# Patient Record
Sex: FEMALE | URBAN - METROPOLITAN AREA
[De-identification: names, ages, dates, MRNs, and addresses within clinical notes are randomized per-mention and may not be internally consistent; named-entity substitution may affect disease eponyms.]

---

## 2023-07-28 ENCOUNTER — PREPPED CHART (OUTPATIENT)
Dept: URBAN - METROPOLITAN AREA CLINIC 50 | Facility: CLINIC | Age: 78
End: 2023-07-28

## 2023-08-23 ENCOUNTER — NEW PATIENT (OUTPATIENT)
Dept: URBAN - METROPOLITAN AREA CLINIC 50 | Facility: LOCATION | Age: 78
End: 2023-08-23

## 2023-08-23 DIAGNOSIS — H02.831: ICD-10-CM

## 2023-08-23 DIAGNOSIS — H02.403: ICD-10-CM

## 2023-08-23 DIAGNOSIS — H02.834: ICD-10-CM

## 2023-08-23 DIAGNOSIS — H25.813: ICD-10-CM

## 2023-08-23 PROCEDURE — 92015 DETERMINE REFRACTIVE STATE: CPT

## 2023-08-23 PROCEDURE — 99204 OFFICE O/P NEW MOD 45 MIN: CPT

## 2023-08-23 ASSESSMENT — KERATOMETRY
OS_AXISANGLE_DEGREES: 91
OD_K2POWER_DIOPTERS: 44.75
OD_K1POWER_DIOPTERS: 44.25
OS_AXISANGLE2_DEGREES: 1
OD_AXISANGLE_DEGREES: 131
OD_AXISANGLE2_DEGREES: 41
OS_K1POWER_DIOPTERS: 44.25
OS_K2POWER_DIOPTERS: 45.00

## 2023-08-23 ASSESSMENT — VISUAL ACUITY
OD_SC: 20/70+2
OS_GLARE: 20/70
OD_GLARE: 20/80
OS_CC: 20/50 BLURRY
OS_SC: 20/60-2
OU_CC: J1+ @ 16"
OS_GLARE: 20/200
OD_GLARE: 20/50
OD_CC: 20/30

## 2023-08-23 ASSESSMENT — TONOMETRY
OD_IOP_MMHG: 15
OS_IOP_MMHG: 13

## 2023-09-20 ENCOUNTER — PRE-OP/H&P (OUTPATIENT)
Dept: URBAN - METROPOLITAN AREA CLINIC 50 | Facility: LOCATION | Age: 78
End: 2023-09-20

## 2023-09-20 DIAGNOSIS — H25.813: ICD-10-CM

## 2023-09-20 PROCEDURE — PREOP PRE OP VISIT

## 2023-09-20 PROCEDURE — 92025-3 CORNEAL TOPO, REFUSED

## 2023-09-20 PROCEDURE — 92136 OPHTHALMIC BIOMETRY: CPT

## 2023-09-20 ASSESSMENT — KERATOMETRY
OD_K2POWER_DIOPTERS: 44.75
OD_AXISANGLE2_DEGREES: 110
OS_AXISANGLE2_DEGREES: 1
OD_AXISANGLE2_DEGREES: 41
OS_K2POWER_DIOPTERS: 44.12
OD_K1POWER_DIOPTERS: 44.50
OD_AXISANGLE_DEGREES: 131
OS_K2POWER_DIOPTERS: 45.00
OD_K2POWER_DIOPTERS: 43.87
OS_K1POWER_DIOPTERS: 44.62
OD_K1POWER_DIOPTERS: 44.25
OS_K1POWER_DIOPTERS: 44.25
OS_AXISANGLE_DEGREES: 174
OD_AXISANGLE_DEGREES: 20
OS_AXISANGLE_DEGREES: 91
OS_AXISANGLE2_DEGREES: 84

## 2023-09-20 ASSESSMENT — VISUAL ACUITY
OD_CC: 20/30-1
OS_PH: 20/40
OU_CC: 20/25
OS_CC: 20/50-1

## 2023-09-20 ASSESSMENT — TONOMETRY
OD_IOP_MMHG: 15
OS_IOP_MMHG: 14

## 2024-04-17 ENCOUNTER — PRE-OP/H&P (OUTPATIENT)
Dept: URBAN - METROPOLITAN AREA CLINIC 50 | Facility: LOCATION | Age: 79
End: 2024-04-17

## 2024-04-17 DIAGNOSIS — H25.813: ICD-10-CM

## 2024-04-17 PROCEDURE — 92136 OPHTHALMIC BIOMETRY: CPT

## 2024-04-17 PROCEDURE — PREOP PRE OP VISIT

## 2024-04-17 ASSESSMENT — VISUAL ACUITY
OD_GLARE: 20/50
OS_CC: 20/40-1
OD_CC: 20/25
OU_CC: J1+@16"
OD_GLARE: 20/70
OS_GLARE: 20/70
OU_CC: 20/25
OS_GLARE: 20/200

## 2024-04-17 ASSESSMENT — KERATOMETRY
OS_AXISANGLE_DEGREES: 91
OD_K2POWER_DIOPTERS: 44.75
OS_K1POWER_DIOPTERS: 44.25
OD_AXISANGLE_DEGREES: 25
OS_AXISANGLE2_DEGREES: 85
OS_K2POWER_DIOPTERS: 44.12
OS_K1POWER_DIOPTERS: 44.87
OS_AXISANGLE_DEGREES: 175
OD_AXISANGLE_DEGREES: 131
OS_AXISANGLE2_DEGREES: 1
OD_K2POWER_DIOPTERS: 43.87
OD_K1POWER_DIOPTERS: 44.25
OS_K2POWER_DIOPTERS: 45.00
OD_AXISANGLE2_DEGREES: 115
OD_AXISANGLE2_DEGREES: 41
OD_K1POWER_DIOPTERS: 44.50

## 2024-04-17 ASSESSMENT — TONOMETRY
OS_IOP_MMHG: 14
OD_IOP_MMHG: 15

## 2024-04-19 ASSESSMENT — KERATOMETRY
OD_AXISANGLE2_DEGREES: 41
OS_K1POWER_DIOPTERS: 44.25
OS_K2POWER_DIOPTERS: 44.12
OD_AXISANGLE2_DEGREES: 115
OS_K1POWER_DIOPTERS: 44.87
OS_AXISANGLE2_DEGREES: 85
OS_AXISANGLE_DEGREES: 91
OS_AXISANGLE2_DEGREES: 1
OS_AXISANGLE_DEGREES: 175
OD_AXISANGLE_DEGREES: 25
OD_K2POWER_DIOPTERS: 44.75
OD_K1POWER_DIOPTERS: 44.50
OD_AXISANGLE_DEGREES: 131
OS_K2POWER_DIOPTERS: 45.00
OD_K2POWER_DIOPTERS: 43.87
OD_K1POWER_DIOPTERS: 44.25

## 2024-04-23 ENCOUNTER — SURGERY/PROCEDURE (OUTPATIENT)
Dept: URBAN - METROPOLITAN AREA SURGERY 16 | Facility: SURGERY | Age: 79
End: 2024-04-23

## 2024-04-23 ENCOUNTER — POST-OP (OUTPATIENT)
Dept: URBAN - METROPOLITAN AREA CLINIC 48 | Facility: LOCATION | Age: 79
End: 2024-04-23

## 2024-04-23 DIAGNOSIS — H25.812: ICD-10-CM

## 2024-04-23 DIAGNOSIS — Z96.1: ICD-10-CM

## 2024-04-23 DIAGNOSIS — Z98.42: ICD-10-CM

## 2024-04-23 PROCEDURE — 66984 XCAPSL CTRC RMVL W/O ECP: CPT

## 2024-04-23 ASSESSMENT — KERATOMETRY
OS_AXISANGLE2_DEGREES: 1
OD_K1POWER_DIOPTERS: 44.25
OS_AXISANGLE2_DEGREES: 85
OS_K1POWER_DIOPTERS: 44.25
OS_K1POWER_DIOPTERS: 44.87
OS_K2POWER_DIOPTERS: 45.00
OS_AXISANGLE_DEGREES: 91
OD_AXISANGLE2_DEGREES: 115
OD_K2POWER_DIOPTERS: 44.75
OS_K2POWER_DIOPTERS: 44.12
OD_AXISANGLE_DEGREES: 25
OD_K2POWER_DIOPTERS: 43.87
OD_AXISANGLE2_DEGREES: 41
OD_AXISANGLE_DEGREES: 131
OD_K1POWER_DIOPTERS: 44.50
OS_AXISANGLE_DEGREES: 175

## 2024-04-23 ASSESSMENT — TONOMETRY: OS_IOP_MMHG: 0

## 2024-04-23 ASSESSMENT — VISUAL ACUITY: OS_SC: 20/80

## 2024-05-01 ENCOUNTER — POST OP/EVAL OF SECOND EYE (OUTPATIENT)
Dept: URBAN - METROPOLITAN AREA CLINIC 50 | Facility: LOCATION | Age: 79
End: 2024-05-01

## 2024-05-01 DIAGNOSIS — H25.811: ICD-10-CM

## 2024-05-01 DIAGNOSIS — Z96.1: ICD-10-CM

## 2024-05-01 DIAGNOSIS — Z98.42: ICD-10-CM

## 2024-05-01 PROCEDURE — 99214 OFFICE O/P EST MOD 30 MIN: CPT | Mod: 24

## 2024-05-01 PROCEDURE — 92136 OPHTHALMIC BIOMETRY: CPT

## 2024-05-01 PROCEDURE — 92134 CPTRZ OPH DX IMG PST SGM RTA: CPT | Mod: NC

## 2024-05-01 RX ORDER — SODIUM CHLORIDE 50 MG/ML
1 SOLUTION OPHTHALMIC
Start: 2024-05-01

## 2024-05-01 ASSESSMENT — KERATOMETRY
OD_K1POWER_DIOPTERS: 44.25
OD_AXISANGLE2_DEGREES: 115
OS_K1POWER_DIOPTERS: 44.25
OS_K2POWER_DIOPTERS: 45.00
OD_AXISANGLE2_DEGREES: 41
OD_K2POWER_DIOPTERS: 44.75
OS_AXISANGLE2_DEGREES: 1
OS_AXISANGLE_DEGREES: 91
OS_K2POWER_DIOPTERS: 44.12
OD_AXISANGLE_DEGREES: 131
OD_K2POWER_DIOPTERS: 43.87
OS_K1POWER_DIOPTERS: 44.87
OD_AXISANGLE_DEGREES: 25
OS_AXISANGLE_DEGREES: 175
OD_K1POWER_DIOPTERS: 44.50
OS_AXISANGLE2_DEGREES: 85

## 2024-05-01 ASSESSMENT — VISUAL ACUITY
OS_SC: 20/40
OD_CC: 20/20-1
OU_CC: 20/20

## 2024-05-01 ASSESSMENT — TONOMETRY
OD_IOP_MMHG: 14
OS_IOP_MMHG: 13

## 2024-05-02 ASSESSMENT — KERATOMETRY
OD_AXISANGLE2_DEGREES: 41
OD_AXISANGLE_DEGREES: 131
OD_K1POWER_DIOPTERS: 44.25
OD_AXISANGLE2_DEGREES: 115
OD_K2POWER_DIOPTERS: 44.75
OS_K1POWER_DIOPTERS: 44.87
OS_AXISANGLE2_DEGREES: 1
OD_K2POWER_DIOPTERS: 43.87
OS_K2POWER_DIOPTERS: 44.12
OS_AXISANGLE_DEGREES: 175
OS_AXISANGLE2_DEGREES: 85
OD_K1POWER_DIOPTERS: 44.50
OS_K1POWER_DIOPTERS: 44.25
OS_AXISANGLE_DEGREES: 91
OD_AXISANGLE_DEGREES: 25
OS_K2POWER_DIOPTERS: 45.00

## 2024-05-07 ENCOUNTER — POST-OP (OUTPATIENT)
Dept: URBAN - METROPOLITAN AREA CLINIC 48 | Facility: LOCATION | Age: 79
End: 2024-05-07

## 2024-05-07 ENCOUNTER — SURGERY/PROCEDURE (OUTPATIENT)
Dept: URBAN - METROPOLITAN AREA SURGERY 16 | Facility: SURGERY | Age: 79
End: 2024-05-07

## 2024-05-07 DIAGNOSIS — H25.811: ICD-10-CM

## 2024-05-07 DIAGNOSIS — Z98.41: ICD-10-CM

## 2024-05-07 PROCEDURE — 66984 XCAPSL CTRC RMVL W/O ECP: CPT | Mod: 79,RT

## 2024-05-07 ASSESSMENT — TONOMETRY
OD_IOP_MMHG: 32
OD_IOP_MMHG: 11

## 2024-05-07 ASSESSMENT — VISUAL ACUITY: OD_SC: 20/70

## 2024-05-15 ENCOUNTER — POST-OP (OUTPATIENT)
Dept: URBAN - METROPOLITAN AREA CLINIC 50 | Facility: LOCATION | Age: 79
End: 2024-05-15

## 2024-05-15 DIAGNOSIS — Z96.1: ICD-10-CM

## 2024-05-15 ASSESSMENT — VISUAL ACUITY
OD_SC: 20/20
OU_SC: 20/20
OS_SC: 20/25-1

## 2024-05-15 ASSESSMENT — TONOMETRY
OS_IOP_MMHG: 13
OD_IOP_MMHG: 14

## 2024-06-12 ENCOUNTER — POST-OP (OUTPATIENT)
Dept: URBAN - METROPOLITAN AREA CLINIC 50 | Facility: LOCATION | Age: 79
End: 2024-06-12

## 2024-06-12 DIAGNOSIS — H52.4: ICD-10-CM

## 2024-06-12 DIAGNOSIS — Z96.1: ICD-10-CM

## 2024-06-12 PROCEDURE — 92015 DETERMINE REFRACTIVE STATE: CPT

## 2024-06-12 ASSESSMENT — VISUAL ACUITY
OS_PH: 20/25
OS_SC: 20/30
OU_SC: 20/20
OD_SC: 20/20

## 2024-06-12 ASSESSMENT — TONOMETRY
OD_IOP_MMHG: 12
OS_IOP_MMHG: 12

## 2024-06-17 ENCOUNTER — ESTABLISHED PATIENT (OUTPATIENT)
Dept: URBAN - METROPOLITAN AREA CLINIC 49 | Facility: LOCATION | Age: 79
End: 2024-06-17

## 2024-06-17 DIAGNOSIS — H20.042: ICD-10-CM

## 2024-06-17 PROCEDURE — 99213 OFFICE O/P EST LOW 20 MIN: CPT

## 2024-06-17 RX ORDER — PREDNISOLONE ACETATE 10 MG/ML: 1 SUSPENSION/ DROPS OPHTHALMIC TWICE A DAY

## 2024-06-17 ASSESSMENT — VISUAL ACUITY
OD_SC: 20/25
OS_SC: 20/25

## 2024-06-17 ASSESSMENT — TONOMETRY
OD_IOP_MMHG: 13
OS_IOP_MMHG: 12

## 2025-06-05 ENCOUNTER — APPOINTMENT (OUTPATIENT)
Dept: URBAN - METROPOLITAN AREA CLINIC 164 | Facility: CLINIC | Age: 80
Setting detail: DERMATOLOGY
End: 2025-06-05

## 2025-06-05 DIAGNOSIS — L57.0 ACTINIC KERATOSIS: ICD-10-CM

## 2025-06-05 DIAGNOSIS — L82.1 OTHER SEBORRHEIC KERATOSIS: ICD-10-CM

## 2025-06-05 DIAGNOSIS — D22 MELANOCYTIC NEVI: ICD-10-CM

## 2025-06-05 DIAGNOSIS — Z12.83 ENCOUNTER FOR SCREENING FOR MALIGNANT NEOPLASM OF SKIN: ICD-10-CM

## 2025-06-05 DIAGNOSIS — L81.4 OTHER MELANIN HYPERPIGMENTATION: ICD-10-CM

## 2025-06-05 DIAGNOSIS — D18.0 HEMANGIOMA: ICD-10-CM

## 2025-06-05 PROBLEM — D22.5 MELANOCYTIC NEVI OF TRUNK: Status: ACTIVE | Noted: 2025-06-05

## 2025-06-05 PROBLEM — D18.01 HEMANGIOMA OF SKIN AND SUBCUTANEOUS TISSUE: Status: ACTIVE | Noted: 2025-06-05

## 2025-06-05 PROCEDURE — ? COUNSELING

## 2025-06-05 PROCEDURE — ? FULL BODY SKIN EXAM

## 2025-06-05 PROCEDURE — ? ADDITIONAL NOTES

## 2025-06-05 PROCEDURE — ? LIQUID NITROGEN

## 2025-06-05 ASSESSMENT — LOCATION ZONE DERM
LOCATION ZONE: ARM
LOCATION ZONE: TRUNK

## 2025-06-05 ASSESSMENT — LOCATION SIMPLE DESCRIPTION DERM
LOCATION SIMPLE: LEFT UPPER BACK
LOCATION SIMPLE: ABDOMEN
LOCATION SIMPLE: RIGHT UPPER BACK
LOCATION SIMPLE: RIGHT FOREARM

## 2025-06-05 ASSESSMENT — LOCATION DETAILED DESCRIPTION DERM
LOCATION DETAILED: EPIGASTRIC SKIN
LOCATION DETAILED: RIGHT PROXIMAL DORSAL FOREARM
LOCATION DETAILED: RIGHT SUPERIOR UPPER BACK
LOCATION DETAILED: PERIUMBILICAL SKIN
LOCATION DETAILED: RIGHT MID-UPPER BACK
LOCATION DETAILED: LEFT MID-UPPER BACK

## 2025-06-05 NOTE — PROCEDURE: LIQUID NITROGEN
Show Applicator Variable?: Yes
Duration Of Freeze Thaw-Cycle (Seconds): 4
Post-Care Instructions: I reviewed with the patient in detail post-care instructions. Patient is to wear sunprotection, and avoid picking at any of the treated lesions. Pt may apply Vaseline to crusted or scabbing areas.
Render Note In Bullet Format When Appropriate: No
Detail Level: Detailed
Number Of Freeze-Thaw Cycles: 1 freeze-thaw cycle
Consent: The patient's consent was obtained including but not limited to risks of crusting, scabbing, blistering, scarring, darker or lighter pigmentary change, recurrence, incomplete removal and infection.

## 2025-07-30 ENCOUNTER — COMPREHENSIVE EXAM (OUTPATIENT)
Age: 80
End: 2025-07-30

## 2025-07-30 DIAGNOSIS — H35.372: ICD-10-CM

## 2025-07-30 DIAGNOSIS — H52.4: ICD-10-CM

## 2025-07-30 DIAGNOSIS — H02.834: ICD-10-CM

## 2025-07-30 DIAGNOSIS — H02.831: ICD-10-CM

## 2025-07-30 DIAGNOSIS — H16.223: ICD-10-CM

## 2025-07-30 PROCEDURE — 92015 DETERMINE REFRACTIVE STATE: CPT

## 2025-07-30 PROCEDURE — 99214 OFFICE O/P EST MOD 30 MIN: CPT
